# Patient Record
Sex: FEMALE | Race: WHITE
[De-identification: names, ages, dates, MRNs, and addresses within clinical notes are randomized per-mention and may not be internally consistent; named-entity substitution may affect disease eponyms.]

---

## 2019-07-08 NOTE — ECGEPIP
[facility]

                                       

                                       Test Date:    2019

Pat Name:     ALEX BRAVO               Department:   

Patient ID:   [id number]                 Room:         -

Gender:       Female                   Technician:   

:          1990               Requested By: Rock Fernandez 

Order Number: [id number]     Reading MD:   Josefa Story

                                 Measurements

Intervals                              Axis          

Rate:         68                       P:            47

TX:           118                      QRS:          60

QRSD:         89                       T:            35

QT:           393                                    

QTc:          419                                    

                           Interpretive Statements

SINUS RHYTHM WITH SHORT TX INTERVAL

Electronically Signed on 2019 16:31:54 EDT by Josefa Story

## 2019-08-04 NOTE — REPVR
EXAM: 

US Pregnancy First Trimester, Transabdominal 



EXAM DATE/TIME: 

8/4/2019 10:36 PM 



CLINICAL HISTORY: 

29 years old, female; Other: Pelvic pain with nausea; Gestational age or lmp: 

15w5d; Pregnant; Additional info: Pelvic pressure, 16 weeks pregnant 



TECHNIQUE: 

Imaging protocol: Real-time transabdominal obstetrical ultrasound of the 

maternal pelvis and a first trimester pregnancy, less than 14 weeks 0 days, 

with image documentation. 



COMPARISON: 

No relevant prior studies available. 



FINDINGS: 



GESTATION: 

Gestation: Single intrauterine fetus. 

Heart rate: Fetal heartbeat of 141 beats per minute. 

Presentation: Breech presentation. 

Placenta: Posterior placenta without previa or abruption. The placenta extends 

to 3.2 cm above the internal os. 

Amniotic fluid: The amount of amniotic fluid appears normal. 

Abdomen: The fetal spine, kidneys, bladder, stomach, diaphragm, cord insertion, 

cord and visualized extremities appear normal. 



BIOMETRY: 

Estimated gestational age: The composite gestational age by ultrasound is 15 

weeks 6 days. 

Estimated due date: The EDC is 1/20/2020. 

Estimated fetal weight: The estimated fetal weight is 145 g grams and is 51 

percentile. 

Biparietal diameter: The BPD measures 3.2 cm suggesting an age of 16 weeks 1 

day. 

Head circumference: The head circumference measures 11.6 cm suggesting an age 

of 15 weeks 5 days. 

Abdominal circumference: The abdominal circumference measures 9.9 cm suggesting 

an age of 16 weeks 0 days. 

Femur length: The femur length measures 2.1 cm suggesting an age of 16 weeks 2 

days. 



MATERNAL: 

Uterus: The lateral ventricle measures 4 mm. There is a right choroid plexus 

cyst measuring 3 x 3 x 4 mm. The remaining intracranial structures appear 

normal. 

Cervix: The cervix is closed and measures 4.1 cm. 

Right adnexa: The ovary is not seen.

Left adnexa: The ovary is not seen.

Intraperitoneal: No intraperitoneal free fluid. 



IMPRESSION: 

1. Single live intrauterine fetus in breech presentation with a composite age 

of 15 weeks 6 days. EDC is 1/20/2020. 

2. Right choroid plexus cyst measuring 3 x 3 x 4 mm. Follow up is suggested. 



Electronically signed by: Joo Reza On 08/04/2019  23:47:42 PM

## 2019-08-15 NOTE — REP
REASON: Fetal anatomy.

 

PRIORS: None.

 

Multiple ultrasonographic images of the gravid uterus show a single living

intrauterine gestational in variable positions. The placenta is posterior and not

low lying. The cervix measures 4.5 cm in length and is closed. Doppler

interrogation of the fetal heart shows a heart rate of 137 beats per minute.

Evaluation of the maternal adnexal spaces showed no abnormalities.

 

The structures seen as unremarkable are as follows: Thalami, cavum septum

pellucidum, cerebellum, cisterna magna, cerebral ventricles, urinary bladder,

cord insertion, three vessel umbilical cord, and stomach. The structures

suboptimally seen today are as follows: Fetal facial features, four chamber heart

and ventricular outflow tracks, kidneys, and fetal extremities particularly the

lower extremity.

 

BPD 3.8 cm 17 weeks 5 days

 

HC 13.9 cm 17 weeks 2 days

 

AC 11.5 cm 17 weeks 2 days

 

FL 2.3 cm 16 weeks 2 days

 

The estimated fetal weight is 182 grams which is at the 35th percentile for 17

week 3 day gestational age.

 

IMPRESSION:

 

Single living intrauterine gestation as descried above with an estimated

gestational age of 17 weeks 2 days via composite criteria and an estimated date

of delivery of 01/21/2020 by today's exam. No fetal anomalies were detected,

however, I recommend a followup examination to image those anatomical structures

not well seen today as described above.

 

 

Electronically Signed by

Martín Santana DO 08/15/2019 07:38 P

## 2019-09-03 NOTE — REP
Clinical:  Anatomical evaluation.

 

Comparison: None .

 

Findings:

Examination demonstrates a single live intrauterine pregnancy in breech

presentation.  Fetal motion is identified by technologist.  Placenta is noted

posterior and grade zero without evidence for placenta previa or abruption.

Amniotic fluid volume is normal.  Cervix measures 4.3 cm in length and appears

closed.  No evidence for nuchal cord.

 

Gestational age by LMP 20 weeks 1 day with KIRTI 01/20/2020 .

Gestational age by current measurements 19 weeks 5 days with KIRTI 01/23/2020 .

 

FHR equals 134 beats per minute.

BPD  4.6 cm     19 weeks 5 days

HC  17.3 cm     19 weeks 6 days

AC  14.3 cm     19 weeks 4 days

FL  3.0 cm      19 weeks 3-day

HL  3.0 cm      19 weeks 5 days

HC/AC ratio  1.21

 

Estimated fetal weight 299 grams ( 28th percentile).

 

Anatomical assessment demonstrates normal structures including facial features,

four-chamber heart/ventricular outflow tracts, kidneys, and extremities.

 

Impression:

1.  Single live intrauterine pregnancy in breech presentation demonstrating

appropriate interval growth.

2.  In conjunction with prior examination anatomical assessment is complete and

normal.

 

 

Electronically Signed by

Miguel Malone MD 09/03/2019 01:36 P

## 2019-12-20 NOTE — IPNPDOC
Text Note


Date of Service


The patient was seen on 12/20/19.





NOTE


BP over last hour+ 130's/70-80's.


Cat I fetal tracing


Per consult Dr Tom - discharged home. Return tomorrow evening for Beta #2


Check blood pressures daily and with increased symptoms


Continue Labetalol 100mg BID


Call with increased headache, chest pain, visual disturbances, SOL or decreased 

fetal movement.


Call office for appt next week.





VS,Fishbone, I+O


VS, Fishbone, I+O


Laboratory Tests


12/20/19 16:02











Vital Signs








  Date Time  Temp Pulse Resp B/P (MAP) Pulse Ox O2 Delivery O2 Flow Rate FiO2


 


12/20/19 20:02  75  139/77 (97)    


 


12/20/19 15:45 97.1  20   Room Air  

















Polly Larson CNM  Dec 20, 2019 20:42

## 2019-12-20 NOTE — REPVR
PROCEDURE INFORMATION: 

Exam: US Pregnancy After First Trimester, Transabdominal 

Exam date and time: 12/20/2019 5:02 PM 

Age: 29 years old 

Clinical indication: Condition or disease; Lmp or gestational age (weeks): 

35weeks; Hypertensive disorders complicating pregnancy; Other: Unknown; 4th 

pregnancy; Pregnant; Additional info: Hypertension. Growth 



TECHNIQUE: 

Imaging protocol: Real-time transabdominal obstetrical ultrasound of the 

maternal pelvis and a second or third trimester pregnancy with image 

documentation. 



COMPARISON: 

US OBS FOLL UP OR REPEAT EACH GES 9/3/2019 12:52 PM 



FINDINGS: 



GESTATION: 

Gestation: Single living intrauterine fetus. 

Heart rate: 160 beats per minute. 

Presentation: Cephalic position. 

Placenta: Fundal posterior grade 2 placenta, no previa. 

Amniotic fluid: Amniotic fluid is normal for gestational age. DONALD = 13.79 cm. 

Head, face, and neck: Normal appearance of the fetal head, face and orbits, 

normal facial profile, face/lips. Lateral ventricular width: 3.4 mm. 

Heart: 4 chambered cardiac anatomy, normal outflow tracts. 

Abdomen: Fluid is present in the fetal stomach and bladder. Normal appearance 

of the fetal kidneys. 

Umbilical cord and insertion: 3 vessel umbilical cord. Normal umbilical cord 

insertion. The S/D ratio is 2.7. The arterial resistive index is 0.63. 

Spine: Normal appearance of the fetal spine. 

Extremities: Normal appearance of the fetal extremities. 

Gender: Female fetal external genitalia. 



BIOMETRY: 

Estimated gestational age: Average ultrasound age 35 weeks 1 day; compare LMP 

35 weeks 4 days. 

Estimated due date: KIRTI 1/23/2020; compare LMP KIRTI 1/20 /2020. 

Estimated fetal weight: Estimated fetal weight 2552; 31 percentile ([place]). 

Biparietal diameter: BPD 34 weeks 3 days. 

Head circumference: Head circumference 36 weeks 5 days. 

Abdominal circumference: Abdominal circumference 35 weeks 1 days. 

Humerus length: Humeral length 34 weeks 1 day. 

Femur length: Femur length 34 weeks 2 days. 



MATERNAL: 

Cervix: The cervical length is 3.51 cm. Closed. 



IMPRESSION: 

Single living intrauterine fetus. Size comparable with dates, appropriate 

interval fetal growth. 



Electronically signed by: Mario Dobbins On 12/20/2019  17:38:08 PM

## 2019-12-20 NOTE — IPNPDOC
Text Note


Date of Service


The patient was seen on 19.





NOTE


Subjective: Patient is a 29-year-old G4, P1 female who is 35 weeks 4 days 

gestation who presents to the hospital after seeing Dr. Jimenez in the office with 

elevated blood pressures. Patient presents to the hospital for preeclamptic 

workup. Patient does say she is been experiencing headaches but denies any 

visual changes, chest pain, or leg swelling. Patient endorses feeling the move 

around. Patient states that she has some light spotting but denies any gush of 

fluid or crampy abdominal pain. Patient's blood pressure in the office was 

146/90. Patient states when she is at the dentist office earlier today her blood

pressure was in the 170s. Patient states that she has been taking her blood 

pressure last week and has been slightly elevated.








Objective: 


Vitals: (see below) 


General: No acute distress, laying comfortably in bed.


Cardiac: RRR, No murmurs


Pulm: Clear to auscultation b/l. No wheezing, rhonchi


Abd: Gravid abdomen with uterine fundus felt above the umbilicus. The uterus is 

soft and nontender.


Ext: No edema bilateral lower extremities


Labs: Pending


Imagining: Biophysical profile . Impression single living intrauterine fetus,

Size comparable with dates, appropriate interval fetal growth.  





Assessment/Plan


Patient is a 29-year-old  female at 35 weeks 4 days gestation just the 

hospital with elevated blood pressures in the office. Patient worked up for 

preeclampsia versus gestational hypertension. Patient does have a history of 

gestational hypertension and possible preeclampsia in her previous term 

pregnancy. A CBC, preeclampsia profile, urinary protein and urinary creatinine 

have been ordered.. A biophysical profile and obstetrical ultrasound has also 

been ordered. Due to the patient's past history of illicit drug use, a urine 

drug screen has also been ordered. Patient does have methadone as a listed 

medication. We will place the patient on fetal monitoring and continue to m

onitor the patient's blood pressure as workup is completed.





VS,Fishbone, I+O


VS, Fishbone, I+O





Vital Signs








  Date Time  Temp Pulse Resp B/P (MAP) Pulse Ox O2 Delivery O2 Flow Rate FiO2


 


19 15:45 97.1 85 20 147/98 (114)  Room Air  











GME ATTESTATION


GME ATTESTATION


My faculty preceptor for this patient encounter was physically present during 

the encounter and was fully available. All aspects of the patient interview, 

examination, medical decision making process, and medical care plan development 

were reviewed and approved by the faculty preceptor. The faculty preceptor is 

aware and concurs with the plan as stated in the body of this note and will 

attest to such by his/her cosignature.











LINDSEY ELDER DO               Dec 20, 2019 16:08

## 2019-12-20 NOTE — IPNPDOC
Text Note


Date of Service


The patient was seen on 12/20/19.





NOTE


Reviewed pt status with Dr Tom.


2 severe range pressures since arrival.


Labs WNL





Labetalol 100mg BID, start now.


Betamethasone IM x 2 doses.


Schedule for IOL 37 wks.





VS,Fishbone, I+O


VS, Fishbone, I+O


Laboratory Tests


12/20/19 16:02











Vital Signs








  Date Time  Temp Pulse Resp B/P (MAP) Pulse Ox O2 Delivery O2 Flow Rate FiO2


 


12/20/19 15:45 97.1 85 20 147/98 (114)  Room Air  

















Polly Larson CNM  Dec 20, 2019 18:25

## 2019-12-30 NOTE — HPEPDOC
Obstetrical History & Physical


General


Date of Admission


Dec 30, 2019 at 09:43


Primary Care Physician:  KARLA VASQUEZ CNM





History of Present Illness


Patient is a 29-year-old female who is a  at 37 weeks gestation with an 

KIRTI of 20 based off of her LMP and consistent with her first trimester 

ultrasound. She initiated care in her second trimester. Her pregnancy  has been 

complicated by GHTN, a history of IV drug use, anxiety, depression, and 

Hepatitis C. She received Betamethasone injections prior to induction due to her

elevated BP's and was placed on PO Labetalol. She is taking Zoloft 25 mg daily 

for depression and anxiety. She is currently living in [facility] and taking 

Methadone 115 mg daily. She reports active fetal movement. She denies leaking of

fluid, contractions, or vaginal bleeding.


Chief Complaint:  Gestational Hypertension


Age:  29


:  4


Term:  1


Pre-term:  0


Abortions:  2


Livin





Prenatal Care


Prenatal Care:  [facility]





Prenatal Dating


Final EDC:  2020


Final EDC by:  LMP


EGA at Admission:  37.0





Antepartum Course


Prenatal Diagnos(e)s


GHTN


Hepatitis C


Depression and anxiety


History of IV drug use-using methadone


Height (inches):  61


Pre-Pregnancy weight (lbs.):  171


Admission Weight (lbs.):  214


Change in Weight (lbs.):  43





Past Medical History


Past Obstetrical History :  


   Gestation:  42


   Type of Delivery:  Spontaneous Vaginal Del. (2012)


   Sex of Infant:  Female (weight: 6 lbs 14 oz)


   Complications:  Yes (GHTN )


Past Medical History


Medical History


Anxiety 


depression


Hepatitis C


Surgical History:  Denies/None





Family History


Significant Family History:  Diabetes, Hypertension





Social History


Social history


multiple social issues-living in [facility]. no custody of daughter


Marital Status:  Single


Psychosocial History:  Anxiety, Depression


* Smoker:  former Smoker


Alcohol:  Denies


Drugs:  other (History of IV drug use of heroin and methamphetamine)





Allergies


Coded Allergies:  


     Sulfa (Sulfonamide Antibiotics) (Verified  Allergy, Severe, anyphylactic 

shock, 19)





Medications


Scheduled


Labetalol HCl (Labetalol HCl) 100 Mg Tablet, 100 MG PO BID


Methadone HCl (Methadone HCl) 10 Mg Tablet, 115 MG PO DAILY


Nicotine Polacrilex (Nicotine Gum) 2 Mg Gum, 2 MG PO QID





Miscellaneous Medications


Docusate Sodium (Dok) 100 Mg Capsule


Prenatal No.137/Iron/Folic Acd (Prenatal Vitamin Tablet) 1 Each Tablet


Sertraline Hcl (Zoloft) 100 Mg Tablet, 100 MG PO





Physical Examination


Physical Examination


GENERAL: Alert and oriented times three.


BREAST: .


ABDOMEN: Gravid and non-tender to touch.


FETUS: Is vertex (VTX) by sterile vaginal examination (SVE), fetus is vertex 

(VTX) by Leopold.


HEART RATE: Regular rate and rhythm.


LUNGS: Clear to auscultation (CTA).


EXTREMITIES: Generalized edema. No clonus. Deep tendon reflexes (DTRs) + 2.





Vital Signs/I&O





                                   Vital Signs








Label Value  Date Time


 


Patient Temperature 98.0 degrees F 19 1028


 


Temperature Source Temporal 19 1028


 


Pulse 80 19 1028


 


Respiratory Rate 16 bpm 19 1028


 


Blood Pressure Assessment 142/79 (100) 19 1028





  Source Automatic Cuff (NIBP) 











Laboratory Data


24H LABS


Laboratory Tests 2


19 10:11: Serology Scanned Report Hepatitis B Testing


Urine Culture:  No Growth





Pertinent Laboratoy Data


Blood Type:  B-


RBC Antibody Screen:  Negative


HIV:  Negative


Hepatitis B:  Negative


Hepatitis C:  Positive


Rapid Plasma Reagin:  Immune


Rubella:  Nonreactive


Chlamydia/Gonorrhea:  Negative


Group B Streptococcus:  Negative


Glucose Tolerance Test:  99





Prenatal Diag/Inter Therapy


NIPT-low risk female





Vaginal Examination


Dilation:  3 cm


Effacement:  80%


Station:  -1


Cervical Consistency:  Soft


Cervical Position:  Anterior


Fetal Presentation:  Cephalic presentation


Fetal Position:  Vertex (occiput)





Fetal Assessment


Fetal Heart Rate (FHR):  135


Variability:  Moderate


Accelerations:  Positive


Decelerations:  None





Tocometer


Contractions:  Yes


Frequency:  other (every 3-6 minutes)





Assessment/Plan


Assessment


IUP at 37 weeks gestation


Gestational hypertension


Hepatitis C


Depression


History of drug abuse (methamphetamines and heroin)-taking methadone


Category I FHR tracing





Plan


Admit to L&D. 


Dr. Tom aware of patient in department and plan of care collaborated with him.


OOB ad valentine. 


Diet: clears.


Group B Streptococcus (GBS) negative.


Labs and intravenous (IV) per unit protocol. Preeclamptic labs ordered. 


Continue with Zoloft, labetalol, and Methadone. 


Counseled on Pitocin and induction of labor (IOL).


Lactated Ringers (LR): 125 cc/hr with IV Pitocin. 500 cc bolus prior to 

epidural. 


Anesthesia and neonatology consult as needed.


Anticipate cervical change and .











KARLA VASQUEZ CNM            Dec 30, 2019 11:15

## 2019-12-30 NOTE — IPNPDOC
Obstetrical Progress Note


Date of Service


Dec 30, 2019





Subjective


Patient reports she is comfortable with her epidural.





Objective





Vital Signs








  Date Time  Temp Pulse Resp B/P (MAP) Pulse Ox O2 Delivery O2 Flow Rate FiO2


 


19 17:47  67 16 116/63 (80)    


 


19 16:26 97.8       











Fetal Assessment


Fetal Heart Rate (FHR):  135


Variability:  Moderate


Accelerations:  Positive


Decelerations:  None


Fetal Heart Rate Tracing:  Category I





Tocometer


Contractions:  Yes





Sterile Vaginal Examination


Dilation:  4 cm


Effacement (%):  80%


Station:  -2


Cervical Consistency:  Medium


Cervical Position:  Anterior


Fetal Postion/Presentation:  Cephalic presentation





Assessment and Plan


Age:  29


:  4


Term:  1


Pre-term:  0


Abortions:  2


Livin


EGA at Admission:  37.0


Fetal Status:  Reassuring


Group B Streptococcus:  Negative


Anticipate:  Vaginal Delivery


Additional Comments


IV Pitocin is a 8 mu/min. AROM to a small amount of clear fluid initially 

followed by a small amount of bloody fluid.











KARLA VASQUEZ CNM            Dec 30, 2019 18:42

## 2019-12-31 NOTE — DNPDOC
[facility] Delivery Note


Delivery Note


DATE OF DELIVERY: 19 at 0440 





PREDELIVERY DIAGNOSIS: 37-1/7 weeks' gestation and labor. 





POST DELIVERY DIAGNOSIS: Delivered.





PROCEDURE: Spontaneous vaginal delivery.





OBSTETRICIAN: Karla Kiser CNM, SHAYLA





ANESTHESIA: epidural.





ESTIMATED BLOOD LOSS: 450 mL.





FINDINGS: 5 pounds 6 ounces; 2440 grams; female infant, Apgar Score 9/10, GHTN, 

+ methadone, + Hepatitis C. 





DELIVERY SUMMARY: Patient is a 29-year-old female who is now a   who 

presented for IOL for GHTN. She received IV Pitocin for IOL and an epidural for 

pain management. The patient progressed to fully dilated at 0307 and pushed to a

living female in the SHE position with restitution to ROT. The anterior shoulder

delivered with ease and the corpus immediately followed. The baby was placed 

skin-to-skin active and crying. The cord was clamped x2 and cut by the patient's

mother. The placenta delivered spontaneously and intact at 0455. Uterine 

hemostasis was achieved via rapid infusion of IV Pitocin and fundal massage. The

perineum, cervix, and vagina was inspected and found to be intact. Mom plans to 

breast feed her . She is naming her Mamta Nunez. Both mom and  are 

in stable condition. All counts of instruments are sponges are correct.











KARLA KISER CNM            Dec 31, 2019 06:48

## 2019-12-31 NOTE — IPNPDOC
Obstetrical Progress Note


Date of Service


Dec 31, 2019





Subjective


Patient feeling uncomfortable with contractions.





Objective





Vital Signs








  Date Time  Temp Pulse Resp B/P (MAP) Pulse Ox O2 Delivery O2 Flow Rate FiO2


 


19 21:03  75  106/57    


 


19 19:22   20     


 


19 19:22 98.0       











Fetal Assessment


Fetal Heart Rate (FHR):  150


Variability:  Moderate


Accelerations:  Positive


Decelerations:  None


Fetal Heart Rate Tracing:  Category I





Tocometer


Contractions:  Yes


Frequency:  regular





Sterile Vaginal Examination


Dilation:  8 cm


Effacement (%):  100%


Station:  -1


Cervical Consistency:  Soft


Cervical Position:  Anterior


Fetal Postion/Presentation:  Cephalic presentation





Assessment and Plan


Age:  29


:  4


Term:  1


Pre-term:  0


Abortions:  2


Livin


EGA at Admission:  37.1


Fetal Status:  Reassuring


Group B Streptococcus:  Negative


Anticipate:  Vaginal Delivery


Additional Comments


IV Pitocin at 10 mu/min. Anesthesia notified to help with pain management.











KARLA VASQUEZ CNM            Dec 31, 2019 00:47

## 2020-01-01 NOTE — IPNPDOC
Text Note


Date of Service


The patient was seen on 1/1/20.





NOTE


Postpartum Day 1


 S/p vaginal delivery; uncomplicated


 


S:Patient is a 29-year-old female who is postpartum day 1 status post vaginal 

delivery. Pain is well-controlled. Patient reports mild cramping. Patient is 

tolerating oral intake. Patient is using the bathroom without difficulty. 

Patient is a lady without difficulty patient denies shortness of breath or leg 

swelling.





O: vitals: See below


Gen.: Alert and oriented female who was laying in bed when I walked in. Patient 

was in no acute distress.


Abd: Uterine fundus 1 cm below the umbilicus and firm


Ext: Trace edema over the dorsum of the feet bilaterally


 


A/P: 30 yo G 4 now P 2021.Postpartum day 1 status post vaginal delivery 

Hemodynamically stable, afebrile, good pain control. Recovering well. 


-Routine postpartum care and advancement.


-Anticipate discharge tomorrow





VS,Fishbone, I+O


VS, Fishbone, I+O





Vital Signs








  Date Time  Temp Pulse Resp B/P (MAP) Pulse Ox O2 Delivery O2 Flow Rate FiO2


 


1/1/20 06:00 97.1 81 18 141/98 (112)    


 


12/31/19 17:56      Room Air  














I&O- Last 24 Hours up to 6 AM 


 


 1/1/20





 06:00


 


Output Total 450 ml


 


Balance -450 ml











GME ATTESTATION


GME ATTESTATION


My faculty preceptor for this patient encounter was physically present during 

the encounter and was fully available. All aspects of the patient interview, 

examination, medical decision making process, and medical care plan development 

were reviewed and approved by the faculty preceptor. The faculty preceptor is 

aware and concurs with the plan as stated in the body of this note and will 

attest to such by his/her cosignature.











LINDSEY ELDER DO                Jan 1, 2020 07:11

## 2020-02-07 ENCOUNTER — HOSPITAL ENCOUNTER (OUTPATIENT)
Dept: HOSPITAL 53 - M LAB | Age: 30
End: 2020-02-07
Attending: PHYSICIAN ASSISTANT
Payer: COMMERCIAL

## 2020-02-07 DIAGNOSIS — Z02.2: Primary | ICD-10-CM

## 2020-02-07 LAB
ALBUMIN SERPL BCG-MCNC: 3.4 GM/DL (ref 3.2–5.2)
ALT SERPL W P-5'-P-CCNC: 88 U/L (ref 12–78)
APPEARANCE UR: CLEAR
BACTERIA UR QL AUTO: (no result)
BASOPHILS # BLD AUTO: 0 10^3/UL (ref 0–0.2)
BASOPHILS NFR BLD AUTO: 0.3 % (ref 0–1)
BILIRUB SERPL-MCNC: 0.4 MG/DL (ref 0.2–1)
BILIRUB UR QL STRIP.AUTO: NEGATIVE
BUN SERPL-MCNC: 17 MG/DL (ref 7–18)
CALCIUM SERPL-MCNC: 8.4 MG/DL (ref 8.5–10.1)
CHLORIDE SERPL-SCNC: 109 MEQ/L (ref 98–107)
CO2 SERPL-SCNC: 27 MEQ/L (ref 21–32)
CREAT SERPL-MCNC: 0.79 MG/DL (ref 0.55–1.3)
EOSINOPHIL # BLD AUTO: 0.2 10^3/UL (ref 0–0.5)
EOSINOPHIL NFR BLD AUTO: 1.9 % (ref 0–3)
GFR SERPL CREATININE-BSD FRML MDRD: > 60 ML/MIN/{1.73_M2} (ref 60–?)
GLUCOSE SERPL-MCNC: 97 MG/DL (ref 70–100)
GLUCOSE UR QL STRIP.AUTO: NEGATIVE MG/DL
HBV CORE IGM SER QL: NEGATIVE
HBV SURFACE AB SER-ACNC: NEGATIVE M[IU]/ML
HCT VFR BLD AUTO: 43.3 % (ref 36–47)
HCV AB SER QL: > 11 INDEX (ref ?–0.8)
HEPATITIS A ANTIBODY IGM: NEGATIVE
HGB BLD-MCNC: 14.8 G/DL (ref 12–15.5)
HGB UR QL STRIP.AUTO: NEGATIVE
KETONES UR QL STRIP.AUTO: NEGATIVE MG/DL
LEUKOCYTE ESTERASE UR QL STRIP.AUTO: (no result)
LYMPHOCYTES # BLD AUTO: 2.2 10^3/UL (ref 1.5–5)
LYMPHOCYTES NFR BLD AUTO: 28.2 % (ref 24–44)
MCH RBC QN AUTO: 30.6 PG (ref 27–33)
MCHC RBC AUTO-ENTMCNC: 34.2 G/DL (ref 32–36.5)
MCV RBC AUTO: 89.5 FL (ref 80–96)
MONOCYTES # BLD AUTO: 0.8 10^3/UL (ref 0–0.8)
MONOCYTES NFR BLD AUTO: 9.6 % (ref 0–5)
MUCOUS THREADS URNS QL MICRO: (no result)
NEUTROPHILS # BLD AUTO: 4.7 10^3/UL (ref 1.5–8.5)
NEUTROPHILS NFR BLD AUTO: 59.6 % (ref 36–66)
NITRITE UR QL STRIP.AUTO: NEGATIVE
PH UR STRIP.AUTO: 6 UNITS (ref 5–9)
PLATELET # BLD AUTO: 292 10^3/UL (ref 150–450)
POTASSIUM SERPL-SCNC: 4.2 MEQ/L (ref 3.5–5.1)
PROT SERPL-MCNC: 7 GM/DL (ref 6.4–8.2)
PROT UR QL STRIP.AUTO: NEGATIVE MG/DL
RBC # BLD AUTO: 4.84 10^6/UL (ref 4–5.4)
RBC # UR AUTO: 4 /HPF (ref 0–3)
SODIUM SERPL-SCNC: 142 MEQ/L (ref 136–145)
SP GR UR STRIP.AUTO: 1.02 (ref 1–1.03)
SQUAMOUS #/AREA URNS AUTO: 2 /HPF (ref 0–6)
UROBILINOGEN UR QL STRIP.AUTO: 0.2 MG/DL (ref 0–2)
WBC # BLD AUTO: 7.9 10^3/UL (ref 4–10)
WBC #/AREA URNS AUTO: 3 /HPF (ref 0–3)

## 2020-03-10 ENCOUNTER — HOSPITAL ENCOUNTER (OUTPATIENT)
Dept: HOSPITAL 53 - M SFHCPLAZ | Age: 30
End: 2020-03-10
Attending: INTERNAL MEDICINE
Payer: COMMERCIAL

## 2020-03-10 DIAGNOSIS — B18.2: Primary | ICD-10-CM

## 2020-03-10 LAB
ALBUMIN SERPL BCG-MCNC: 3.8 GM/DL (ref 3.2–5.2)
ALT SERPL W P-5'-P-CCNC: 99 U/L (ref 12–78)
BILIRUB CONJ SERPL-MCNC: < 0.1 MG/DL (ref 0–0.2)
BILIRUB SERPL-MCNC: 0.4 MG/DL (ref 0.2–1)
PROT SERPL-MCNC: 7.8 GM/DL (ref 6.4–8.2)